# Patient Record
Sex: FEMALE | Race: WHITE | Employment: UNEMPLOYED | URBAN - METROPOLITAN AREA
[De-identification: names, ages, dates, MRNs, and addresses within clinical notes are randomized per-mention and may not be internally consistent; named-entity substitution may affect disease eponyms.]

---

## 2023-10-03 ENCOUNTER — HOSPITAL ENCOUNTER (EMERGENCY)
Facility: HOSPITAL | Age: 38
Discharge: HOME | End: 2023-10-03
Attending: EMERGENCY MEDICINE

## 2023-10-03 VITALS
WEIGHT: 140 LBS | RESPIRATION RATE: 19 BRPM | HEART RATE: 87 BPM | SYSTOLIC BLOOD PRESSURE: 131 MMHG | TEMPERATURE: 98 F | DIASTOLIC BLOOD PRESSURE: 89 MMHG | OXYGEN SATURATION: 100 % | BODY MASS INDEX: 22.5 KG/M2 | HEIGHT: 66 IN

## 2023-10-03 DIAGNOSIS — R21 RASH AND OTHER NONSPECIFIC SKIN ERUPTION: Primary | ICD-10-CM

## 2023-10-03 PROCEDURE — 99284 EMERGENCY DEPT VISIT MOD MDM: CPT

## 2023-10-03 PROCEDURE — 99283 EMERGENCY DEPT VISIT LOW MDM: CPT

## 2023-10-03 RX ORDER — DOXYCYCLINE HYCLATE 100 MG/1
100 CAPSULE ORAL 2 TIMES DAILY
Qty: 14 CAPSULE | Refills: 0 | Status: SHIPPED | OUTPATIENT
Start: 2023-10-03 | End: 2023-10-10

## 2023-10-03 RX ORDER — ESCITALOPRAM OXALATE 10 MG/1
10 TABLET ORAL DAILY
COMMUNITY

## 2023-10-03 NOTE — ED INITIAL ASSESSMENT (HPI)
36y/o female arrives with c/o facial flushing starting at 1130 am. Pt was started on a medrol dose pack and clindamycin 300mg QID yesterday for a skin reaction from a bug bite. She had 3 doses yesterday and 1 this am of clindamycin. Pt called her pcp who recommended she be evaluated. No YARA, facial swelling, difficulty swallowing, or hives.

## 2023-10-03 NOTE — DISCHARGE INSTRUCTIONS
Please take doxycycline for 7 days. Drink a full glass of water after taking each doxycycline capsule to avoid esophageal irritation.

## 2024-01-14 ENCOUNTER — APPOINTMENT (OUTPATIENT)
Dept: CT IMAGING | Facility: HOSPITAL | Age: 39
End: 2024-01-14
Attending: NURSE PRACTITIONER
Payer: COMMERCIAL

## 2024-01-14 ENCOUNTER — APPOINTMENT (OUTPATIENT)
Dept: ULTRASOUND IMAGING | Facility: HOSPITAL | Age: 39
End: 2024-01-14
Attending: NURSE PRACTITIONER
Payer: COMMERCIAL

## 2024-01-14 ENCOUNTER — APPOINTMENT (OUTPATIENT)
Dept: MRI IMAGING | Facility: HOSPITAL | Age: 39
End: 2024-01-14
Attending: NURSE PRACTITIONER
Payer: COMMERCIAL

## 2024-01-14 ENCOUNTER — APPOINTMENT (OUTPATIENT)
Dept: GENERAL RADIOLOGY | Facility: HOSPITAL | Age: 39
End: 2024-01-14
Attending: NURSE PRACTITIONER
Payer: COMMERCIAL

## 2024-01-14 ENCOUNTER — HOSPITAL ENCOUNTER (OUTPATIENT)
Facility: HOSPITAL | Age: 39
Setting detail: OBSERVATION
Discharge: HOME | End: 2024-01-15
Attending: STUDENT IN AN ORGANIZED HEALTH CARE EDUCATION/TRAINING PROGRAM | Admitting: STUDENT IN AN ORGANIZED HEALTH CARE EDUCATION/TRAINING PROGRAM
Payer: COMMERCIAL

## 2024-01-14 DIAGNOSIS — K80.50 BILIARY COLIC: Primary | ICD-10-CM

## 2024-01-14 DIAGNOSIS — R10.11 RUQ PAIN: ICD-10-CM

## 2024-01-14 DIAGNOSIS — R05.1 ACUTE COUGH: ICD-10-CM

## 2024-01-14 DIAGNOSIS — K83.8 DILATION OF COMMON BILE DUCT: ICD-10-CM

## 2024-01-14 LAB
ALBUMIN SERPL-MCNC: 4.8 G/DL (ref 3.2–4.8)
ALBUMIN/GLOB SERPL: 1.7 {RATIO} (ref 1–2)
ALP LIVER SERPL-CCNC: 89 U/L
ALT SERPL-CCNC: 20 U/L
ANION GAP SERPL CALC-SCNC: 3 MMOL/L (ref 0–18)
AST SERPL-CCNC: 24 U/L (ref ?–34)
ATRIAL RATE: 100 BPM
B-HCG UR QL: NEGATIVE
BASOPHILS # BLD AUTO: 0.03 X10(3) UL (ref 0–0.2)
BASOPHILS NFR BLD AUTO: 0.4 %
BILIRUB SERPL-MCNC: 0.8 MG/DL (ref 0.3–1.2)
BILIRUB UR QL: NEGATIVE
BUN BLD-MCNC: 12 MG/DL (ref 9–23)
BUN/CREAT SERPL: 13.5 (ref 10–20)
CALCIUM BLD-MCNC: 9.6 MG/DL (ref 8.7–10.4)
CHLORIDE SERPL-SCNC: 105 MMOL/L (ref 98–112)
CLARITY UR: CLEAR
CO2 SERPL-SCNC: 28 MMOL/L (ref 21–32)
COLOR UR: COLORLESS
CREAT BLD-MCNC: 0.89 MG/DL
D DIMER PPP FEU-MCNC: <0.27 UG/ML FEU (ref ?–0.5)
DEPRECATED RDW RBC AUTO: 43.3 FL (ref 35.1–46.3)
EGFRCR SERPLBLD CKD-EPI 2021: 85 ML/MIN/1.73M2 (ref 60–?)
EOSINOPHIL # BLD AUTO: 0.1 X10(3) UL (ref 0–0.7)
EOSINOPHIL NFR BLD AUTO: 1.2 %
ERYTHROCYTE [DISTWIDTH] IN BLOOD BY AUTOMATED COUNT: 12.4 % (ref 11–15)
FLUAV + FLUBV RNA SPEC NAA+PROBE: NEGATIVE
FLUAV + FLUBV RNA SPEC NAA+PROBE: NEGATIVE
GLOBULIN PLAS-MCNC: 2.8 G/DL (ref 2.8–4.4)
GLUCOSE BLD-MCNC: 92 MG/DL (ref 70–99)
GLUCOSE UR-MCNC: NORMAL MG/DL
HCT VFR BLD AUTO: 43.5 %
HGB BLD-MCNC: 14.7 G/DL
HGB UR QL STRIP.AUTO: NEGATIVE
IMM GRANULOCYTES # BLD AUTO: 0.02 X10(3) UL (ref 0–1)
IMM GRANULOCYTES NFR BLD: 0.2 %
KETONES UR-MCNC: NEGATIVE MG/DL
LEUKOCYTE ESTERASE UR QL STRIP.AUTO: NEGATIVE
LIPASE SERPL-CCNC: 41 U/L (ref 13–75)
LYMPHOCYTES # BLD AUTO: 2.15 X10(3) UL (ref 1–4)
LYMPHOCYTES NFR BLD AUTO: 25.4 %
MCH RBC QN AUTO: 32.2 PG (ref 26–34)
MCHC RBC AUTO-ENTMCNC: 33.8 G/DL (ref 31–37)
MCV RBC AUTO: 95.2 FL
MONOCYTES # BLD AUTO: 0.89 X10(3) UL (ref 0.1–1)
MONOCYTES NFR BLD AUTO: 10.5 %
NEUTROPHILS # BLD AUTO: 5.29 X10 (3) UL (ref 1.5–7.7)
NEUTROPHILS # BLD AUTO: 5.29 X10(3) UL (ref 1.5–7.7)
NEUTROPHILS NFR BLD AUTO: 62.3 %
NITRITE UR QL STRIP.AUTO: NEGATIVE
OSMOLALITY SERPL CALC.SUM OF ELEC: 281 MOSM/KG (ref 275–295)
P AXIS: 77 DEGREES
P-R INTERVAL: 130 MS
PH UR: 6.5 [PH] (ref 5–8)
PLATELET # BLD AUTO: 261 10(3)UL (ref 150–450)
POTASSIUM SERPL-SCNC: 3.7 MMOL/L (ref 3.5–5.1)
PROT SERPL-MCNC: 7.6 G/DL (ref 5.7–8.2)
PROT UR-MCNC: NEGATIVE MG/DL
Q-T INTERVAL: 368 MS
QRS DURATION: 68 MS
QTC CALCULATION (BEZET): 474 MS
R AXIS: 60 DEGREES
RBC # BLD AUTO: 4.57 X10(6)UL
RSV RNA SPEC NAA+PROBE: NEGATIVE
SARS-COV-2 RNA RESP QL NAA+PROBE: NOT DETECTED
SODIUM SERPL-SCNC: 136 MMOL/L (ref 136–145)
SP GR UR STRIP: 1 (ref 1–1.03)
T AXIS: 39 DEGREES
TROPONIN I SERPL HS-MCNC: <3 NG/L
UROBILINOGEN UR STRIP-ACNC: NORMAL
VENTRICULAR RATE: 100 BPM
WBC # BLD AUTO: 8.5 X10(3) UL (ref 4–11)

## 2024-01-14 PROCEDURE — 99285 EMERGENCY DEPT VISIT HI MDM: CPT

## 2024-01-14 PROCEDURE — 76376 3D RENDER W/INTRP POSTPROCES: CPT | Performed by: NURSE PRACTITIONER

## 2024-01-14 PROCEDURE — 81003 URINALYSIS AUTO W/O SCOPE: CPT

## 2024-01-14 PROCEDURE — 96374 THER/PROPH/DIAG INJ IV PUSH: CPT

## 2024-01-14 PROCEDURE — A9575 INJ GADOTERATE MEGLUMI 0.1ML: HCPCS | Performed by: NURSE PRACTITIONER

## 2024-01-14 PROCEDURE — 74177 CT ABD & PELVIS W/CONTRAST: CPT | Performed by: NURSE PRACTITIONER

## 2024-01-14 PROCEDURE — S0028 INJECTION, FAMOTIDINE, 20 MG: HCPCS | Performed by: NURSE PRACTITIONER

## 2024-01-14 PROCEDURE — 96375 TX/PRO/DX INJ NEW DRUG ADDON: CPT

## 2024-01-14 PROCEDURE — 84484 ASSAY OF TROPONIN QUANT: CPT | Performed by: NURSE PRACTITIONER

## 2024-01-14 PROCEDURE — 71045 X-RAY EXAM CHEST 1 VIEW: CPT | Performed by: NURSE PRACTITIONER

## 2024-01-14 PROCEDURE — 0241U SARS-COV-2/FLU A AND B/RSV BY PCR (GENEXPERT): CPT

## 2024-01-14 PROCEDURE — 93005 ELECTROCARDIOGRAM TRACING: CPT

## 2024-01-14 PROCEDURE — 85025 COMPLETE CBC W/AUTO DIFF WBC: CPT

## 2024-01-14 PROCEDURE — 81025 URINE PREGNANCY TEST: CPT

## 2024-01-14 PROCEDURE — 76705 ECHO EXAM OF ABDOMEN: CPT | Performed by: NURSE PRACTITIONER

## 2024-01-14 PROCEDURE — 74183 MRI ABD W/O CNTR FLWD CNTR: CPT | Performed by: NURSE PRACTITIONER

## 2024-01-14 PROCEDURE — 83690 ASSAY OF LIPASE: CPT | Performed by: NURSE PRACTITIONER

## 2024-01-14 PROCEDURE — 85379 FIBRIN DEGRADATION QUANT: CPT | Performed by: NURSE PRACTITIONER

## 2024-01-14 PROCEDURE — 80053 COMPREHEN METABOLIC PANEL: CPT

## 2024-01-14 RX ORDER — PROCHLORPERAZINE EDISYLATE 5 MG/ML
5 INJECTION INTRAMUSCULAR; INTRAVENOUS EVERY 8 HOURS PRN
Status: DISCONTINUED | OUTPATIENT
Start: 2024-01-14 | End: 2024-01-15

## 2024-01-14 RX ORDER — ONDANSETRON 2 MG/ML
4 INJECTION INTRAMUSCULAR; INTRAVENOUS EVERY 6 HOURS PRN
Status: DISCONTINUED | OUTPATIENT
Start: 2024-01-14 | End: 2024-01-15

## 2024-01-14 RX ORDER — SODIUM CHLORIDE 9 MG/ML
INJECTION, SOLUTION INTRAVENOUS CONTINUOUS
Status: DISCONTINUED | OUTPATIENT
Start: 2024-01-14 | End: 2024-01-15

## 2024-01-14 RX ORDER — GADOTERATE MEGLUMINE 376.9 MG/ML
15 INJECTION INTRAVENOUS
Status: COMPLETED | OUTPATIENT
Start: 2024-01-14 | End: 2024-01-14

## 2024-01-14 RX ORDER — TRAMADOL HYDROCHLORIDE 50 MG/1
50 TABLET ORAL EVERY 6 HOURS PRN
Status: DISCONTINUED | OUTPATIENT
Start: 2024-01-14 | End: 2024-01-15

## 2024-01-14 RX ORDER — DROSPIRENONE 4 MG/1
4 TABLET, FILM COATED ORAL DAILY
COMMUNITY

## 2024-01-14 RX ORDER — MORPHINE SULFATE 2 MG/ML
2 INJECTION, SOLUTION INTRAMUSCULAR; INTRAVENOUS ONCE
Status: COMPLETED | OUTPATIENT
Start: 2024-01-14 | End: 2024-01-14

## 2024-01-14 RX ORDER — MAGNESIUM HYDROXIDE/ALUMINUM HYDROXICE/SIMETHICONE 120; 1200; 1200 MG/30ML; MG/30ML; MG/30ML
30 SUSPENSION ORAL ONCE
Status: COMPLETED | OUTPATIENT
Start: 2024-01-14 | End: 2024-01-14

## 2024-01-14 RX ORDER — MELATONIN
3 NIGHTLY PRN
Status: DISCONTINUED | OUTPATIENT
Start: 2024-01-14 | End: 2024-01-15

## 2024-01-14 RX ORDER — FAMOTIDINE 10 MG/ML
20 INJECTION, SOLUTION INTRAVENOUS ONCE
Status: COMPLETED | OUTPATIENT
Start: 2024-01-14 | End: 2024-01-14

## 2024-01-14 RX ORDER — ESCITALOPRAM OXALATE 10 MG/1
10 TABLET ORAL EVERY MORNING
Status: DISCONTINUED | OUTPATIENT
Start: 2024-01-15 | End: 2024-01-15

## 2024-01-14 NOTE — H&P
[facility] [place] Hospitalist H&P       CC:   Chief Complaint   Patient presents with    Abdomen/Flank Pain        PCP: PHYSICIAN NONSTAFF    History of Present Illness: Patient is a 38 year old female with PMH sig for SHAILESH and kidney stones who presented to the hospital with RUQ pain. Patient states that she has been having on and off RUQ pain since thanksgiving. She states that she has had multiple respiratory illnesses over this time period and every time she develops a cough she then has the pain. The pain has persisted. She has tried NSAIDS  with improvement but then pain returns. This morning she woke up and was having some pain. She then had breakfast with her children which included muffins and donuts. Afterwards she developed severe RUQ pain which prompted her to come to the ED. No nausea, fevers, chest pain. Upon arrival vitals stable, labwork unremarkable. CT A/P showed dilated CBD. Plan for admission for further medical management.     PMH  Past Medical History:   Diagnosis Date    Endometriosis     Kidney stones         PSH  Past Surgical History:   Procedure Laterality Date    LITHOTRIPSY          ALL:  Allergies   Allergen Reactions    Norco [Apap-Fd&C Blue #1-Hydrocodone] HIVES    Potassium Citrate HIVES    Clindamycin RASH        Home Medications:  No outpatient medications have been marked as taking for the 1/14/24 encounter (Hospital Encounter).         Soc Hx  Social History     Tobacco Use    Smoking status: Every Day     Packs/day: 0.50     Years: 1.00     Additional pack years: 0.00     Total pack years: 0.50     Types: Cigarettes    Smokeless tobacco: Never   Substance Use Topics    Alcohol use: Yes     Alcohol/week: 14.0 standard drinks of alcohol     Types: 14 Glasses of wine per week        Fam Hx  No family history on file.    Review of Systems  Comprehensive ROS reviewed and negative except for what's stated above.     OBJECTIVE:  BP (!) 130/91   Pulse 87   Temp 97.7 °F (36.5 °C)  (Temporal)   Resp 20   Ht 5' 6\" (1.676 m)   Wt 138 lb (62.6 kg)   LMP  (LMP Unknown)   SpO2 99%   BMI 22.27 kg/m²   General:  Alert, no distress   Head:  Normocephalic               Neck: Supple   Lungs:   Clear to auscultation bilaterally. Normal effort, R chest wall TTP       Heart:  Regular rate and rhythm, S1, S2 normal   Abdomen:   Soft, mild RUQ TTP   Extremities: Extremities normal             Diagnostic Data:    CBC/Chem  Recent Labs   Lab 01/14/24  1337   WBC 8.5   HGB 14.7   MCV 95.2   .0       Recent Labs   Lab 01/14/24  1337      K 3.7      CO2 28.0   BUN 12   CREATSERUM 0.89   GLU 92   CA 9.6       Recent Labs   Lab 01/14/24  1337   ALT 20   AST 24   ALB 4.8       No results for input(s): \"TROP\" in the last 168 hours.    Radiology: US GALLBLADDER (CPT=76705)    Result Date: 1/14/2024  CONCLUSION: Common bile duct is enlarged at 10 mm.  Etiology unclear, however raises the possibility of distal common bile duct stricture or choledocholithiasis.  Otherwise unremarkable CT appearance of the gallbladder.  No intrahepatic biliary ductal dilatation.     Dictated by (CST): Ezra Jorgensen MD on 1/14/2024 at 4:31 PM     Finalized by (CST): Ezra Jorgensen MD on 1/14/2024 at 4:32 PM          CT ABDOMEN+PELVIS(CONTRAST ONLY)(CPT=74177)    Result Date: 1/14/2024  CONCLUSION:  Nonspecific enlargement the common bile duct at 10 mm.  No significant intrahepatic biliary ductal dilatation.  Unremarkable CT appearance of the gallbladder.  Etiology of the biliary ductal dilatation is unclear.  Correlate for biliary colic and biliary  lab abnormality.  Dictated by (CST): Ezra Jorgensen MD on 1/14/2024 at 2:53 PM     Finalized by (CST): Ezra Jorgensen MD on 1/14/2024 at 2:56 PM          XR CHEST AP PORTABLE  (CPT=71045)    Result Date: 1/14/2024  CONCLUSION: No acute cardiopulmonary abnormality.    Dictated by (CST): Ezra Jorgensen MD on 1/14/2024 at 2:37 PM     Finalized by (CST): Ezra Jorgensen MD on 1/14/2024  at 2:37 PM             ASSESSMENT / PLAN:   Patient is a 38 year old female with PMH sig for SHAILESH and kidney stones who presented to the hospital with RUQ pain.     RUQ abdominal pain  - ddx includes combination of costochondritis along with biliary colic, possible choledocholithiasis or patient may have passed stone  - labwork unremarkable, vitals stable  - CT A/P and RUQ US reviewed, dilated CBD at 1 cm, unremarkable gallbladder  - prn pain medications  - IVF, NPO for now  - MRCP ordered, if abnormal then will GI colleagues to see    Costochondritis  - pain to palpation over the R chest  - PRN pain medications  - lidocaine patch    SHAILESH  - lexapro    FN:  - IVF: NS  - Diet: NPO    DVT Prophy: ambulation  Lines: PIV    Dispo: pending clinical course    Outpatient records or previous hospital records reviewed.     Further recommendations pending patient's clinical course.  [facility] hospitalist to continue to follow patient while in house    Patient and/or patient's family given opportunity to ask questions and note understanding and agreeing with therapeutic plan as outlined    Thank You,  Samantha Duvall DO    Hospitalist with [facility]  Answering Service number: [phone]

## 2024-01-14 NOTE — ED QUICK NOTES
Orders for admission, patient is aware of plan and ready to go upstairs. Any questions, please call ED RN Miroslava at extension [phone].     Patient Covid vaccination status: Fully vaccinated     COVID Test Ordered in ED: SARS-CoV-2/Flu A and B/RSV by PCR (GeneXpert)    COVID Suspicion at Admission: N/A    Running Infusions:  None    Mental Status/LOC at time of transport: alert and orientated x4, independent walker, no home 02, lives in private home with family.     Other pertinent information:   CIWA score: N/A   NIH score:  N/A

## 2024-01-14 NOTE — ED INITIAL ASSESSMENT (HPI)
C/o RUQ abd pain that radiates to right flank. Reports hx of kidney stones but usually to left kidney. Pt reports she had mild pain since thanksgiving but has had a cold/cough, unsure if she caused worsening pain due to coughing.

## 2024-01-14 NOTE — ED PROVIDER NOTES
Patient Seen in: [facility] Emergency Department      History     Chief Complaint   Patient presents with    Abdomen/Flank Pain     Stated Complaint: Flank Pain    Subjective:   HPI    38-year-old female presents today with complaints of intermittent right upper quadrant pain since Thanksgiving.  Patient states after Thanksgiving she developed COVID and has had a cough since then.  Patient states she has had several chest x-rays to rule out pneumonia and all was negative.  Patient states her primary care provider attributed the right sided torso pain to inflammation in the cartilage and she has been taking meloxicam with some relief.  Patient states that her pain began to get worse today and she has severe pain with deep inspiration in the right upper quadrant.  Patient states that she is on birth control and does not know if it has estrogen in it.  Patient denies any recent travel.  Patient denies any shortness of breath.  Patient denies any changes in her bowel pattern.  Patient denies any changes in intake.    Objective:   Past Medical History:   Diagnosis Date    Endometriosis     Kidney stones               Past Surgical History:   Procedure Laterality Date    LITHOTRIPSY                  Social History     Socioeconomic History    Marital status:    Tobacco Use    Smoking status: Every Day     Packs/day: 0.50     Years: 1.00     Additional pack years: 0.00     Total pack years: 0.50     Types: Cigarettes    Smokeless tobacco: Never   Vaping Use    Vaping Use: Never used   Substance and Sexual Activity    Alcohol use: Yes     Alcohol/week: 14.0 standard drinks of alcohol     Types: 14 Glasses of wine per week    Drug use: Not Currently              Review of Systems   Constitutional: Negative.    HENT: Negative.     Eyes: Negative.    Respiratory:  Positive for cough. Negative for shortness of breath.    Cardiovascular: Negative.    Gastrointestinal:  Positive for abdominal pain.   Endocrine:  Negative.    Genitourinary: Negative.    Musculoskeletal: Negative.    Allergic/Immunologic: Negative.    Neurological: Negative.    Hematological: Negative.    Psychiatric/Behavioral: Negative.         Positive for stated complaint: Flank Pain  Other systems are as noted in HPI.  Constitutional and vital signs reviewed.      All other systems reviewed and negative except as noted above.    Physical Exam     ED Triage Vitals [01/14/24 1305]   /84   Pulse 108   Resp 20   Temp 97.7 °F (36.5 °C)   Temp src Temporal   SpO2 99 %   O2 Device None (Room air)       Current:/88   Pulse 75   Temp 97.7 °F (36.5 °C) (Temporal)   Resp 20   Ht 167.6 cm (5' 6\")   Wt 62.6 kg   LMP  (LMP Unknown)   SpO2 99%   BMI 22.27 kg/m²         Physical Exam  Vitals and nursing note reviewed. Exam conducted with a chaperone present.   Constitutional:       Appearance: She is well-developed and normal weight.   HENT:      Head: Normocephalic.      Mouth/Throat:      Mouth: Mucous membranes are moist.      Pharynx: Oropharynx is clear.   Eyes:      Extraocular Movements: Extraocular movements intact.      Pupils: Pupils are equal, round, and reactive to light.   Cardiovascular:      Rate and Rhythm: Regular rhythm. Tachycardia present.      Heart sounds: Normal heart sounds.   Pulmonary:      Effort: Pulmonary effort is normal.      Breath sounds: Wheezing and rhonchi present.      Comments: Right upper torso pain reproducible with deep inspiration.  Abdominal:      General: Abdomen is flat. Bowel sounds are normal.      Palpations: Abdomen is soft.      Tenderness: There is abdominal tenderness in the right upper quadrant and epigastric area. There is no right CVA tenderness or left CVA tenderness.   Skin:     General: Skin is warm and dry.      Capillary Refill: Capillary refill takes less than 2 seconds.   Neurological:      Mental Status: She is alert and oriented to person, place, and time.   Psychiatric:         Mood and  Affect: Mood normal.             ED Course     Labs Reviewed   URINALYSIS, ROUTINE - Abnormal; Notable for the following components:       Result Value    Urine Color Colorless (*)     All other components within normal limits   COMP METABOLIC PANEL (14) - Normal   LIPASE - Normal   D-DIMER - Normal   TROPONIN I HIGH SENSITIVITY - Normal   POCT PREGNANCY URINE - Normal   SARS-COV-2/FLU A AND B/RSV BY PCR (GENEXPERT) - Normal    Narrative:     This test is intended for the qualitative detection and differentiation of SARS-CoV-2, influenza A, influenza B, and respiratory syncytial virus (RSV) viral RNA in nasopharyngeal or nares swabs from individuals suspected of respiratory viral infection consistent with COVID-19 by their healthcare provider. Signs and symptoms of respiratory viral infection due to SARS-CoV-2, influenza, and RSV can be similar.    Test performed using the Xpert Xpress SARS-CoV-2/FLU/RSV (real time RT-PCR)  assay on the [employer] instrument, [employer], [place] [address].   This test is being used under the Food and Drug Administration's Emergency Use Authorization.    The authorized Fact Sheet for Healthcare Providers for this assay is available upon request from the laboratory.   CBC WITH DIFFERENTIAL WITH PLATELET    Narrative:     The following orders were created for panel order CBC With Differential With Platelet.  Procedure                               Abnormality         Status                     ---------                               -----------         ------                     CBC W/ DIFFERENTIAL[[id number]]                              Final result                 Please view results for these tests on the individual orders.   CBC W/ DIFFERENTIAL                      MDM      38-year-old female presents today with complaints of intermittent right upper quadrant pain since Thanksgiving.  Patient states after Thanksgiving she developed COVID and has had a cough since then.  Patient  states she has had several chest x-rays to rule out pneumonia and all was negative.  Patient states her primary care provider attributed the right sided torso pain to inflammation in the cartilage and she has been taking meloxicam with some relief.  Patient states that her pain began to get worse today and she has severe pain with deep inspiration in the right upper quadrant.  Patient states that she is on birth control and does not know if it has estrogen in it.  Patient denies any recent travel.  Patient denies any shortness of breath.  Patient denies any changes in her bowel pattern.  Patient denies any changes in intake.  Vital signs: Please see EMR.  Physical exam: Please see exam.  Differential diagnosis: Pneumonia, kidney stone, pyelonephritis, cholecystitis, pulmonary embolism.  PERC rule: PE cannot be ruled out.  Recent Results (from the past 24 hour(s))   Comp Metabolic Panel (14)    Collection Time: 01/14/24  1:37 PM   Result Value Ref Range    Glucose 92 70 - 99 mg/dL    Sodium 136 136 - 145 mmol/L    Potassium 3.7 3.5 - 5.1 mmol/L    Chloride 105 98 - 112 mmol/L    CO2 28.0 21.0 - 32.0 mmol/L    Anion Gap 3 0 - 18 mmol/L    BUN 12 9 - 23 mg/dL    Creatinine 0.89 0.55 - 1.02 mg/dL    BUN/CREA Ratio 13.5 10.0 - 20.0    Calcium, Total 9.6 8.7 - 10.4 mg/dL    Calculated Osmolality 281 275 - 295 mOsm/kg    eGFR-Cr 85 >=60 mL/min/1.73m2    ALT 20 10 - 49 U/L    AST 24 <=34 U/L    Alkaline Phosphatase 89 37 - 98 U/L    Bilirubin, Total 0.8 0.3 - 1.2 mg/dL    Total Protein 7.6 5.7 - 8.2 g/dL    Albumin 4.8 3.2 - 4.8 g/dL    Globulin  2.8 2.8 - 4.4 g/dL    A/G Ratio 1.7 1.0 - 2.0   Urinalysis, Routine    Collection Time: 01/14/24  1:37 PM   Result Value Ref Range    Urine Color Colorless (A) Yellow    Clarity Urine Clear Clear    Spec Gravity 1.005 1.005 - 1.030    Glucose Urine Normal Normal mg/dL    Bilirubin Urine Negative Negative    Ketones Urine Negative Negative mg/dL    Blood Urine Negative Negative     pH Urine 6.5 5.0 - 8.0    Protein Urine Negative Negative mg/dL    Urobilinogen Urine Normal Normal    Nitrite Urine Negative Negative    Leukocyte Esterase Urine Negative Negative    Microscopic Microscopic not indicated    SARS-CoV-2/Flu A and B/RSV by PCR (GeneXpert)    Collection Time: 01/14/24  1:37 PM    Specimen: Nares; Other   Result Value Ref Range    SARS-CoV-2 (COVID-19) - (GeneXpert) Not Detected Not Detected    Influenza A by PCR Negative Negative    Influenza B by PCR Negative Negative    RSV by PCR Negative Negative   CBC W/ DIFFERENTIAL    Collection Time: 01/14/24  1:37 PM   Result Value Ref Range    WBC 8.5 4.0 - 11.0 x10(3) uL    RBC 4.57 3.80 - 5.30 x10(6)uL    HGB 14.7 12.0 - 16.0 g/dL    HCT 43.5 35.0 - 48.0 %    MCV 95.2 80.0 - 100.0 fL    MCH 32.2 26.0 - 34.0 pg    MCHC 33.8 31.0 - 37.0 g/dL    RDW-SD 43.3 35.1 - 46.3 fL    RDW 12.4 11.0 - 15.0 %    .0 150.0 - 450.0 10(3)uL    Neutrophil Absolute Prelim 5.29 1.50 - 7.70 x10 (3) uL    Neutrophil Absolute 5.29 1.50 - 7.70 x10(3) uL    Lymphocyte Absolute 2.15 1.00 - 4.00 x10(3) uL    Monocyte Absolute 0.89 0.10 - 1.00 x10(3) uL    Eosinophil Absolute 0.10 0.00 - 0.70 x10(3) uL    Basophil Absolute 0.03 0.00 - 0.20 x10(3) uL    Immature Granulocyte Absolute 0.02 0.00 - 1.00 x10(3) uL    Neutrophil % 62.3 %    Lymphocyte % 25.4 %    Monocyte % 10.5 %    Eosinophil % 1.2 %    Basophil % 0.4 %    Immature Granulocyte % 0.2 %   Lipase    Collection Time: 01/14/24  1:37 PM   Result Value Ref Range    Lipase 41 13 - 75 U/L   Troponin I (High Sensitivity)    Collection Time: 01/14/24  1:37 PM   Result Value Ref Range    Troponin I (High Sensitivity) <3 <=34 ng/L   D-Dimer    Collection Time: 01/14/24  2:03 PM   Result Value Ref Range    D-Dimer <0.27 <0.50 ug/mL FEU   POCT Pregnancy, Urine    Collection Time: 01/14/24  2:03 PM   Result Value Ref Range    POCT Urine Pregnancy Negative Negative   EKG    Collection Time: 01/14/24  2:18 PM   Result  Value Ref Range    Ventricular rate 100 BPM    Atrial rate 100 BPM    P-R Interval 130 ms    QRS Duration 68 ms    Q-T Interval 368 ms    QTC Calculation (Bezet) 474 ms    P Axis 77 degrees    R Axis 60 degrees    T Axis 39 degrees     MRI ABDOMEN AND MRCP W/3D (W+W0)(CPT=74183/80069)    Result Date: 1/14/2024  CONCLUSION:  MRCP demonstrates common bile duct enlargement at 9 mm.  Distal common bile duct is blunted.  Punctate filling defect within the distal common bile duct/ampulla suspicious for choledocholithiasis measuring 2-3 mm in diameter.  Dictated by (CST): Ezra Jorgensen MD on 1/14/2024 at 6:57 PM     Finalized by (CST): Ezra Jorgensen MD on 1/14/2024 at 7:01 PM          US GALLBLADDER (CPT=76705)    Result Date: 1/14/2024  CONCLUSION: Common bile duct is enlarged at 10 mm.  Etiology unclear, however raises the possibility of distal common bile duct stricture or choledocholithiasis.  Otherwise unremarkable CT appearance of the gallbladder.  No intrahepatic biliary ductal dilatation.     Dictated by (CST): Ezra Jorgensen MD on 1/14/2024 at 4:31 PM     Finalized by (CST): Ezra Jorgensen MD on 1/14/2024 at 4:32 PM          CT ABDOMEN+PELVIS(CONTRAST ONLY)(CPT=74177)    Result Date: 1/14/2024  CONCLUSION:  Nonspecific enlargement the common bile duct at 10 mm.  No significant intrahepatic biliary ductal dilatation.  Unremarkable CT appearance of the gallbladder.  Etiology of the biliary ductal dilatation is unclear.  Correlate for biliary colic and biliary  lab abnormality.  Dictated by (CST): Ezra Jorgensen MD on 1/14/2024 at 2:53 PM     Finalized by (CST): Ezra Jorgensen MD on 1/14/2024 at 2:56 PM          XR CHEST AP PORTABLE  (CPT=71045)    Result Date: 1/14/2024  CONCLUSION: No acute cardiopulmonary abnormality.    Dictated by (CST): Ezra Jorgensen MD on 1/14/2024 at 2:37 PM     Finalized by (CST): Ezra Jorgensen MD on 1/14/2024 at 2:37 PM         EKG  Order: [id number]  Status: Final result       Visible to patient: Yes  (not seen)       Next appt: None    0 Result Notes      Component  Ref Range & Units 3 d ago   Ventricular rate     Atrial rate     P-R Interval  ms 130   QRS Duration  ms 68   Q-T Interval  ms 368   QTC Calculation (Bezet)  ms 474   P Axis  degrees 77   R Axis  degrees 60   T Axis  degrees 39   Resulting Agency [facility]              Narrative  Performed by: [facility]  Normal sinus rhythm  Possible Anteroseptal infarct , age undetermined  Abnormal ECG  No previous ECGs found in [facility]  Confirmed by VAL WALL PRATIK ([id number]) on 1/14/2024 3:08:46 PM           Will diagnosed with enlarged common bile duct and biliary colic.  Patient will be admitted due to biliary colic.  Hospitalist being paged.  Hospitalist to patient's bedside.  MRCP with and without ordered.  Bed requested for medical observation.  Note to Patient  The 21st Century Cures Act makes medical notes like these available to patients in the interest of transparency. However, be advised this is a medical document and is intended as [id number]-oaxy communication; it is written in medical language and may appear blunt, direct, or contain abbreviations or verbiage that are unfamiliar. Medical documents are intended to carry relevant information, facts as evident, and the clinical opinion of the practitioner.     This report has been produced using speech recognition software, and may contain errors related to grammar, punctuation, spelling, words or phrases unrecognized or not translated appropriately to text; these errors may be referred to the dictating provider for further clarification and/or addendum as needed.    Admission disposition: 1/14/2024  5:29 PM                                        Medical Decision Making  38-year-old female presents today with complaints of intermittent right upper quadrant pain since Thanksgiving.  Patient states after Thanksgiving she developed COVID and has had a cough since then.  Patient states she has had several  chest x-rays to rule out pneumonia and all was negative.  Patient states her primary care provider attributed the right sided torso pain to inflammation in the cartilage and she has been taking meloxicam with some relief.  Patient states that her pain began to get worse today and she has severe pain with deep inspiration in the right upper quadrant.  Patient states that she is on birth control and does not know if it has estrogen in it.  Patient denies any recent travel.  Patient denies any shortness of breath.  Patient denies any changes in her bowel pattern.  Patient denies any changes in intake.    Problems Addressed:  Acute cough: acute illness or injury  Biliary colic: acute illness or injury  Dilation of common bile duct: acute illness or injury  RUQ pain: acute illness or injury    Amount and/or Complexity of Data Reviewed  Labs: ordered. Decision-making details documented in ED Course.  Radiology: ordered. Decision-making details documented in ED Course.  ECG/medicine tests: ordered. Decision-making details documented in ED Course.    Risk  Decision regarding hospitalization.        Disposition and Plan     Clinical Impression:  1. RUQ pain    2. Acute cough    3. Dilation of common bile duct    4. Biliary colic         Disposition:  Admit  1/14/2024  5:29 pm    Follow-up:  No follow-up provider specified.        Medications Prescribed:  Current Discharge Medication List                            Hospital Problems       Present on Admission             ICD-10-CM Noted POA    * (Principal) RUQ pain R10.11 1/14/2024 Unknown

## 2024-01-15 ENCOUNTER — ANESTHESIA EVENT (OUTPATIENT)
Dept: ENDOSCOPY | Facility: HOSPITAL | Age: 39
End: 2024-01-15
Payer: COMMERCIAL

## 2024-01-15 ENCOUNTER — APPOINTMENT (OUTPATIENT)
Dept: GENERAL RADIOLOGY | Facility: HOSPITAL | Age: 39
End: 2024-01-15
Attending: INTERNAL MEDICINE
Payer: COMMERCIAL

## 2024-01-15 ENCOUNTER — ANESTHESIA (OUTPATIENT)
Dept: ENDOSCOPY | Facility: HOSPITAL | Age: 39
End: 2024-01-15
Payer: COMMERCIAL

## 2024-01-15 VITALS
RESPIRATION RATE: 20 BRPM | SYSTOLIC BLOOD PRESSURE: 112 MMHG | OXYGEN SATURATION: 97 % | BODY MASS INDEX: 21.58 KG/M2 | HEART RATE: 62 BPM | HEIGHT: 66 IN | TEMPERATURE: 99 F | WEIGHT: 134.31 LBS | DIASTOLIC BLOOD PRESSURE: 74 MMHG

## 2024-01-15 LAB
ALBUMIN SERPL-MCNC: 4.1 G/DL (ref 3.2–4.8)
ALBUMIN/GLOB SERPL: 1.6 {RATIO} (ref 1–2)
ALP LIVER SERPL-CCNC: 68 U/L
ALT SERPL-CCNC: 16 U/L
ANION GAP SERPL CALC-SCNC: 5 MMOL/L (ref 0–18)
AST SERPL-CCNC: 18 U/L (ref ?–34)
BILIRUB SERPL-MCNC: 1.1 MG/DL (ref 0.3–1.2)
BUN BLD-MCNC: 12 MG/DL (ref 9–23)
BUN/CREAT SERPL: 14.1 (ref 10–20)
CALCIUM BLD-MCNC: 9 MG/DL (ref 8.7–10.4)
CHLORIDE SERPL-SCNC: 110 MMOL/L (ref 98–112)
CO2 SERPL-SCNC: 26 MMOL/L (ref 21–32)
CREAT BLD-MCNC: 0.85 MG/DL
DEPRECATED RDW RBC AUTO: 43.7 FL (ref 35.1–46.3)
EGFRCR SERPLBLD CKD-EPI 2021: 89 ML/MIN/1.73M2 (ref 60–?)
ERYTHROCYTE [DISTWIDTH] IN BLOOD BY AUTOMATED COUNT: 12.5 % (ref 11–15)
GLOBULIN PLAS-MCNC: 2.5 G/DL (ref 2.8–4.4)
GLUCOSE BLD-MCNC: 86 MG/DL (ref 70–99)
HCT VFR BLD AUTO: 40.5 %
HGB BLD-MCNC: 13.6 G/DL
MCH RBC QN AUTO: 31.9 PG (ref 26–34)
MCHC RBC AUTO-ENTMCNC: 33.6 G/DL (ref 31–37)
MCV RBC AUTO: 95.1 FL
OSMOLALITY SERPL CALC.SUM OF ELEC: 291 MOSM/KG (ref 275–295)
PLATELET # BLD AUTO: 187 10(3)UL (ref 150–450)
POTASSIUM SERPL-SCNC: 3.8 MMOL/L (ref 3.5–5.1)
PROT SERPL-MCNC: 6.6 G/DL (ref 5.7–8.2)
RBC # BLD AUTO: 4.26 X10(6)UL
SODIUM SERPL-SCNC: 141 MMOL/L (ref 136–145)
WBC # BLD AUTO: 5.7 X10(3) UL (ref 4–11)

## 2024-01-15 PROCEDURE — 85027 COMPLETE CBC AUTOMATED: CPT | Performed by: STUDENT IN AN ORGANIZED HEALTH CARE EDUCATION/TRAINING PROGRAM

## 2024-01-15 PROCEDURE — 80053 COMPREHEN METABOLIC PANEL: CPT | Performed by: STUDENT IN AN ORGANIZED HEALTH CARE EDUCATION/TRAINING PROGRAM

## 2024-01-15 PROCEDURE — 0DJ08ZZ INSPECTION OF UPPER INTESTINAL TRACT, VIA NATURAL OR ARTIFICIAL OPENING ENDOSCOPIC: ICD-10-PCS | Performed by: INTERNAL MEDICINE

## 2024-01-15 RX ORDER — IBUPROFEN 200 MG
200 TABLET ORAL EVERY 6 HOURS PRN
Status: DISCONTINUED | OUTPATIENT
Start: 2024-01-15 | End: 2024-01-15

## 2024-01-15 RX ORDER — SODIUM CHLORIDE, SODIUM LACTATE, POTASSIUM CHLORIDE, CALCIUM CHLORIDE 600; 310; 30; 20 MG/100ML; MG/100ML; MG/100ML; MG/100ML
INJECTION, SOLUTION INTRAVENOUS CONTINUOUS PRN
Status: DISCONTINUED | OUTPATIENT
Start: 2024-01-15 | End: 2024-01-15 | Stop reason: SURG

## 2024-01-15 RX ORDER — LIDOCAINE HYDROCHLORIDE 10 MG/ML
INJECTION, SOLUTION EPIDURAL; INFILTRATION; INTRACAUDAL; PERINEURAL AS NEEDED
Status: DISCONTINUED | OUTPATIENT
Start: 2024-01-15 | End: 2024-01-15 | Stop reason: SURG

## 2024-01-15 RX ADMIN — SODIUM CHLORIDE, SODIUM LACTATE, POTASSIUM CHLORIDE, CALCIUM CHLORIDE: 600; 310; 30; 20 INJECTION, SOLUTION INTRAVENOUS at 16:07:00

## 2024-01-15 RX ADMIN — LIDOCAINE HYDROCHLORIDE 50 MG: 10 INJECTION, SOLUTION EPIDURAL; INFILTRATION; INTRACAUDAL; PERINEURAL at 16:10:00

## 2024-01-15 NOTE — CONSULTS
[facility]    Report of Consultation    Nia Reilly Patient Status:  Observation    1985 MRN [id number]   Location [facility] 5SW/SE Attending Samantha Duvall,    Hosp Day # 0 PCP PHYSICIAN NONSTAFF     Date of Admission:  2024  Date of Consult:  1/15/2024  Reason for Consultation:   Dilated CBD    History of Present Illness:   Patient is a 38 year old female who was admitted to the hospital for RUQ pain:  Patient states that she has been having on and off RUQ pain since . Prior to arrival in ER developed severe RUQ pain prompting her to come to the ER. Denies nausea, vomiting, fever. Initial lab work unremarkable CT A/P showed dilated CBD.  MRCP with bile duct dilatation and filling deficit suspicious for choledolithiasis.    abdominal surgeries notable for laparoscopic removal of ovarian cysts    Past Medical History  Past Medical History:   Diagnosis Date    Endometriosis     Kidney stones        Past Surgical History  Past Surgical History:   Procedure Laterality Date    LITHOTRIPSY         Family History  No family history on file.    Social History  Pediatric History   Patient Parents    Not on file     Other Topics Concern    Not on file   Social History Narrative    Not on file           Current Medications:  Current Facility-Administered Medications   Medication Dose Route Frequency    sodium chloride 0.9% infusion   Intravenous Continuous    melatonin tab 3 mg  3 mg Oral Nightly PRN    ondansetron (Zofran) 4 MG/2ML injection 4 mg  4 mg Intravenous Q6H PRN    prochlorperazine (Compazine) 10 MG/2ML injection 5 mg  5 mg Intravenous Q8H PRN    traMADol (Ultram) tab 50 mg  50 mg Oral Q6H PRN    lidocaine-menthol 4-1 % patch 1 patch  1 patch Transdermal Daily    escitalopram (Lexapro) tab 10 mg  10 mg Oral QAM     Medications Prior to Admission   Medication Sig    Drospirenone (SLYND) 4 MG Oral Tab Take 4 tablets by mouth daily.    escitalopram 10 MG  Oral Tab Take 1 tablet (10 mg total) by mouth daily.       Allergies  Allergies   Allergen Reactions    Norco [Apap-Fd&C Blue #1-Hydrocodone] HIVES    Potassium Citrate HIVES    Clindamycin RASH       Review of Systems:   GENERAL HEALTH: feels well otherwise, denies fever or weight loss  SKIN: denies any unusual skin lesions or rashes  EYES: no visual complaints or deficits  HEENT: denies mouth sores  RESPIRATORY: no shortness of breath   CARDIOVASCULAR:no chest pain   GI: Refer to HPI,   : no hematuria  MUSCULOSKELETAL: no joint swelling or warmth  NEURO: no focal weakness or numbness  PSYCHE: no depression or anxiety  HEMATOLOGIC: no easy bruising  ENDOCRINE: no temperature intolerance    Physical Exam:   Blood pressure 126/83, pulse 83, temperature 98.6 °F (37 °C), temperature source Oral, resp. rate 18, height 5' 6\" (1.676 m), weight 134 lb 4.8 oz (60.9 kg), SpO2 98%.    GENERAL: well developed, in no apparent distress  SKIN: no rashes,no suspicious lesions  HEENT: atraumatic, normocephalic, oropharynx clear  NECK: supple,no adenopathy, no masses  LUNGS: clear to auscultation  CARDIO: RRR without murmur  GI: normal active BS, RUQ pain radiating to back, no masses or ascites, normal liver span/no organomegaly  EXTREMITIES: no calf tenderness  MUSCULOSKELETAL: no calf tenderness  PSYCH: normal affect    Results:     Laboratory Data:  Recent Labs   Lab 01/14/24  1337 01/15/24  0543   WBC 8.5 5.7   HGB 14.7 13.6   HCT 43.5 40.5   .0 187.0   CREATSERUM 0.89 0.85   BUN 12 12    141   K 3.7 3.8    110   CO2 28.0 26.0   GLU 92 86   CA 9.6 9.0   ALB 4.8 4.1   ALKPHO 89 68   TP 7.6 6.6   AST 24 18   ALT 20 16       Recent Labs   Lab 01/14/24  1337 01/14/24  1403   LIP 41  --    DDIMER  --  <0.27        Imaging:  MRI ABDOMEN AND MRCP W/3D (W+W0)(CPT=74183/64777)    Result Date: 1/14/2024  CONCLUSION:  MRCP demonstrates common bile duct enlargement at 9 mm.  Distal common bile duct is blunted.  Punctate  filling defect within the distal common bile duct/ampulla suspicious for choledocholithiasis measuring 2-3 mm in diameter.  Dictated by (CST): Ezra Jorgensen MD on 1/14/2024 at 6:57 PM     Finalized by (CST): Ezra Jorgensen MD on 1/14/2024 at 7:01 PM          US GALLBLADDER (CPT=76705)    Result Date: 1/14/2024  CONCLUSION: Common bile duct is enlarged at 10 mm.  Etiology unclear, however raises the possibility of distal common bile duct stricture or choledocholithiasis.  Otherwise unremarkable CT appearance of the gallbladder.  No intrahepatic biliary ductal dilatation.     Dictated by (CST): Ezra Jorgensen MD on 1/14/2024 at 4:31 PM     Finalized by (CST): Ezra Jorgensen MD on 1/14/2024 at 4:32 PM          CT ABDOMEN+PELVIS(CONTRAST ONLY)(CPT=74177)    Result Date: 1/14/2024  CONCLUSION:  Nonspecific enlargement the common bile duct at 10 mm.  No significant intrahepatic biliary ductal dilatation.  Unremarkable CT appearance of the gallbladder.  Etiology of the biliary ductal dilatation is unclear.  Correlate for biliary colic and biliary  lab abnormality.  Dictated by (CST): Ezra Jorgensen MD on 1/14/2024 at 2:53 PM     Finalized by (CST): Ezra Jorgensen MD on 1/14/2024 at 2:56 PM          XR CHEST AP PORTABLE  (CPT=71045)    Result Date: 1/14/2024  CONCLUSION: No acute cardiopulmonary abnormality.    Dictated by (CST): Ezra Jorgensen MD on 1/14/2024 at 2:37 PM     Finalized by (CST): Ezra Jorgensen MD on 1/14/2024 at 2:37 PM            Impression:   Patient is a 38 year old female who was admitted to the hospital for RUQ pain:     -CT A/P showed dilated CBD.  MRCP with bile duct dilatation and filling deficit suspicious for choledolithiasis.  - labs without abnormalities  - continued abdominal pain    Plan:  - maintain npo  - ivf, antibiotics  - continue follow labs  - pain management  - trend labs  - ERCP  today with Dr Tavarez  - will need cholecystectomy, recommend surgical consult while inpatient    Time spent in direct  patient contact and decision making as well as counseling/coordination of care:  50 minutes    Thank you for allowing me to participate in the care of your patient.    Barbara Venegas MD  1/15/2024

## 2024-01-15 NOTE — H&P
History & Physical Examination    Patient Name: Nia Reilly  MRN: [id number]  [facility]: [id number]  YOB: 1985    Diagnosis: Biliary colic [K80.50]  RUQ pain [R10.11]  Dilation of common bile duct [K83.8]  Acute cough [R05.1]      Present Illness:  Nia Reilly is a 38 year old female is here Biliary colic [K80.50]  RUQ pain [R10.11]  Dilation of common bile duct [K83.8]  Acute cough [R05.1].    Body mass index is 21.68 kg/m².    Past Medical History:   Diagnosis Date    Endometriosis     Kidney stones        Procedure: EUS ERCP    Physician Pre-Sedation Assessment    Pre-Sedation Assessment:    Sedation History: Airway Assessed    ASA Classification: 2. Patient with mild systemic disease    Cardiac:    Respiratory:    Abdomen:      Plan: MAC        Current Facility-Administered Medications   Medication Dose Route Frequency    guaiFENesin (Robitussin) 100 MG/5 ML oral liquid 200 mg  200 mg Oral Q4H PRN    piperacillin-tazobactam (Zosyn) 3.375 g in dextrose 5% 100 mL IVPB-ADDV  3.375 g Intravenous Q8H    sodium chloride 0.9% infusion   Intravenous Continuous    melatonin tab 3 mg  3 mg Oral Nightly PRN    ondansetron (Zofran) 4 MG/2ML injection 4 mg  4 mg Intravenous Q6H PRN    prochlorperazine (Compazine) 10 MG/2ML injection 5 mg  5 mg Intravenous Q8H PRN    traMADol (Ultram) tab 50 mg  50 mg Oral Q6H PRN    lidocaine-menthol 4-1 % patch 1 patch  1 patch Transdermal Daily    escitalopram (Lexapro) tab 10 mg  10 mg Oral QAM       Allergies:   Allergies   Allergen Reactions    Norco [Apap-Fd&C Blue #1-Hydrocodone] HIVES    Potassium Citrate HIVES    Clindamycin RASH       Past Surgical History:   Procedure Laterality Date    CYST ASPIRATION LEFT      LITHOTRIPSY       History reviewed. No pertinent family history.  Social History     Tobacco Use    Smoking status: Every Day     Packs/day: 0.50     Years: 1.00     Additional pack years: 0.00     Total pack years: 0.50     Types: Cigarettes    Smokeless  tobacco: Never   Substance Use Topics    Alcohol use: Yes     Alcohol/week: 14.0 standard drinks of alcohol     Types: 14 Glasses of wine per week       SYSTEM Check if Review is Normal Check if Physical Exam is Normal If not normal, please explain:   HEENT [x ] [x ]    NECK & BACK [x ] [x ]    HEART [x ] [x ]    LUNGS [x ] [x ]    ABDOMEN [x ] [x ]    UROGENITAL [ ] [ ]    EXTREMITIES [ ] [ ]    OTHER        [ x ] I have discussed the risks and benefits and alternatives with the patient/family.  They understand and agree to proceed with plan of care.  [ x ] I have reviewed the History and Physical done within the last 30 days.  Any changes noted above.    Demario Tavarez MD  1/15/2024  3:55 PM

## 2024-01-15 NOTE — ANESTHESIA POSTPROCEDURE EVALUATION
Patient: Nia Reilly    Procedure Summary       Date: 01/15/24 Room / Location: [facility] ENDOSCOPY 05 / [facility] ENDOSCOPY    Anesthesia Start: 1607 Anesthesia Stop: 1630    Procedure: ENDOSCOPIC ULTRASOUND (EUS) Diagnosis: (dilated bile duct)    Surgeons: Demario Tavarez MD Anesthesiologist: Ashley Souza CRNA    Anesthesia Type: general ASA Status: 2            Anesthesia Type: general    Vitals Value Taken Time   /74 01/15/24 1628   Temp 98.6 °F (37 °C) 01/15/24 1628   Pulse 61 01/15/24 1628   Resp 16 01/15/24 1628   SpO2 99 % 01/15/24 1628       [facility] AN Post Evaluation:   Patient Evaluated in PACU  Patient Participation: complete - patient participated  Level of Consciousness: sleepy but conscious  Pain Score: 0  Pain Management: adequate  Airway Patency:patent  Dental exam unchanged from preop  Yes    Cardiovascular Status: acceptable  Respiratory Status: acceptable and room air  Postoperative Hydration acceptable      Ashley Souza CRNA  1/15/2024 4:30 PM

## 2024-01-15 NOTE — PROGRESS NOTES
[facility] Hospitalist Progress Note     CC: Hospital Follow up    PCP: PHYSICIAN NONSTAFF       Assessment/Plan:     Principal Problem:    RUQ pain  Active Problems:    Acute cough    Dilation of common bile duct    Biliary colic    Patient is a 38 year old female with PMH sig for SHAILESH and kidney stones who presented to the hospital with RUQ pain.      Choledocholithiasis  - labwork unremarkable, vitals stable  - CT A/P and RUQ US reviewed, dilated CBD at 1 cm, unremarkable gallbladder  - prn pain medications  - IVF, NPO for now  - MRCP with choledocholithiasis  - GI consulted  - will need outpatient f/u for cholecystectomy     Costochondritis  - pain to palpation over the R chest  - PRN pain medications  - lidocaine patch     SHAILESH  - lexapro     FN:  - IVF: NS  - Diet: NPO     DVT Prophy: ambulation  Lines: PIV       Thank You,  Samantha Duvall, DO    Hospitalist with [facility]     Subjective:   Patient feels ok. No further worsening of RUQ abdominal pain.  at bedside    OBJECTIVE:    Blood pressure 126/83, pulse 83, temperature 98.6 °F (37 °C), temperature source Oral, resp. rate 18, height 5' 6\" (1.676 m), weight 134 lb 4.8 oz (60.9 kg), SpO2 98%.    Temp:  [97.7 °F (36.5 °C)-98.7 °F (37.1 °C)] 98.6 °F (37 °C)  Pulse:  [] 83  Resp:  [16-22] 18  BP: (118-136)/(78-93) 126/83  SpO2:  [96 %-100 %] 98 %      Intake/Output:    Intake/Output Summary (Last 24 hours) at 1/15/2024 1017  Last data filed at 1/15/2024 0628  Gross per 24 hour   Intake --   Output 150 ml   Net -150 ml       Last 3 Weights   01/14/24 1926 134 lb 4.8 oz (60.9 kg)   01/14/24 1305 138 lb (62.6 kg)   01/14/24 1734 138 lb 0.1 oz (62.6 kg)   10/03/23 1258 140 lb (63.5 kg)       Exam   Gen: No acute distress,  Heent: NC AT, neck supple  Pulm: Lungs clear, normal respiratory effort  CV: Heart with regular rate and rhythm  Abd: Abdomen soft, no TTP  MSK: no clubbing, no cyanosis  Skin: no rashes or lesions        Data  Review:       Labs:     Recent Labs   Lab 01/14/24  1337 01/15/24  0543   RBC 4.57 4.26   HGB 14.7 13.6   HCT 43.5 40.5   MCV 95.2 95.1   MCH 32.2 31.9   MCHC 33.8 33.6   RDW 12.4 12.5   NEPRELIM 5.29  --    WBC 8.5 5.7   .0 187.0         Recent Labs   Lab 01/14/24  1337 01/15/24  0543   GLU 92 86   BUN 12 12   CREATSERUM 0.89 0.85   EGFRCR 85 89   CA 9.6 9.0    141   K 3.7 3.8    110   CO2 28.0 26.0       Recent Labs   Lab 01/14/24  1337 01/15/24  0543   ALT 20 16   AST 24 18   ALB 4.8 4.1         Imaging:  MRI ABDOMEN AND MRCP W/3D (W+W0)(CPT=74183/[id number])    Result Date: 1/14/2024  CONCLUSION:  MRCP demonstrates common bile duct enlargement at 9 mm.  Distal common bile duct is blunted.  Punctate filling defect within the distal common bile duct/ampulla suspicious for choledocholithiasis measuring 2-3 mm in diameter.  Dictated by (CST): Ezra Jorgensen MD on 1/14/2024 at 6:57 PM     Finalized by (CST): Ezra Jorgesnen MD on 1/14/2024 at 7:01 PM          US GALLBLADDER (CPT=[id number])    Result Date: 1/14/2024  CONCLUSION: Common bile duct is enlarged at 10 mm.  Etiology unclear, however raises the possibility of distal common bile duct stricture or choledocholithiasis.  Otherwise unremarkable CT appearance of the gallbladder.  No intrahepatic biliary ductal dilatation.     Dictated by (CST): Ezra Jorgensen MD on 1/14/2024 at 4:31 PM     Finalized by (CST): Ezra Jorgensen MD on 1/14/2024 at 4:32 PM          CT ABDOMEN+PELVIS(CONTRAST ONLY)(CPT=74177)    Result Date: 1/14/2024  CONCLUSION:  Nonspecific enlargement the common bile duct at 10 mm.  No significant intrahepatic biliary ductal dilatation.  Unremarkable CT appearance of the gallbladder.  Etiology of the biliary ductal dilatation is unclear.  Correlate for biliary colic and biliary  lab abnormality.  Dictated by (CST): Ezra Jorgensen MD on 1/14/2024 at 2:53 PM     Finalized by (CST): Ezra Jorgensen MD on 1/14/2024 at 2:56 PM          XR CHEST AP PORTABLE   (CPT=[id number])    Result Date: 1/14/2024  CONCLUSION: No acute cardiopulmonary abnormality.    Dictated by (CST): Ezra Jorgensen MD on 1/14/2024 at 2:37 PM     Finalized by (CST): Ezra Jorgensen MD on 1/14/2024 at 2:37 PM             Meds:      lidocaine-menthol  1 patch Transdermal Daily    escitalopram  10 mg Oral QAM      sodium chloride 100 mL/hr at 01/15/24 0622     melatonin, ondansetron, prochlorperazine, traMADol

## 2024-01-15 NOTE — PLAN OF CARE
Patient affirms mild pain. Lidocaine patch in place,NPO.safety precautions in place.call light within reach. Plan team to see patient am and review MRCP result.  Problem: Patient Centered Care  Goal: Patient preferences are identified and integrated in the patient's plan of care  Description: Interventions:  - What would you like us to know as we care for you? From home with   - Provide timely, complete, and accurate information to patient/family  - Incorporate patient and family knowledge, values, beliefs, and cultural backgrounds into the planning and delivery of care  - Encourage patient/family to participate in care and decision-making at the level they choose  - Honor patient and family perspectives and choices  Outcome: Progressing     Problem: Patient/Family Goals  Goal: Patient/Family Long Term Goal  Description: Patient's Long Term Goal: go home    Interventions:  - see MD orders  - See additional Care Plan goals for specific interventions  Outcome: Progressing  Goal: Patient/Family Short Term Goal  Description: Patient's Short Term Goal: feel better    Interventions:   - monitor labs   -monitor v/s  - See additional Care Plan goals for specific interventions  Outcome: Progressing     Problem: PAIN - ADULT  Goal: Verbalizes/displays adequate comfort level or patient's stated pain goal  Description: INTERVENTIONS:  - Encourage pt to monitor pain and request assistance  - Assess pain using appropriate pain scale  - Administer analgesics based on type and severity of pain and evaluate response  - Implement non-pharmacological measures as appropriate and evaluate response  - Consider cultural and social influences on pain and pain management  - Manage/alleviate anxiety  - Utilize distraction and/or relaxation techniques  - Monitor for opioid side effects  - Notify MD/LIP if interventions unsuccessful or patient reports new pain  - Anticipate increased pain with activity and pre-medicate as  appropriate  Outcome: Progressing

## 2024-01-15 NOTE — ANESTHESIA PREPROCEDURE EVALUATION
Anesthesia PreOp Note    HPI:     Nia Reilly is a 38 year old female who presents for preoperative consultation requested by: Demario Tavarez MD    Date of Surgery: 1/14/2024 - 1/15/2024    Procedure(s):  ENDOSCOPIC RETROGRADE CHOLANGIOPANCREATOGRAPHY (ERCP)/ENDOSCOPIC ULTRASOUND (EUS)  ENDOSCOPIC ULTRASOUND (EUS)  Indication: choledocholithiasis    Relevant Problems   No relevant active problems       NPO:  Last Liquid Consumption Date: 01/15/24  Last Liquid Consumption Time: 0900  Last Solid Consumption Date: 01/14/24  Last Solid Consumption Time: 0930  Last Liquid Consumption Date: 01/15/24          History Review:  Patient Active Problem List    Diagnosis Date Noted    RUQ pain 01/14/2024    Acute cough 01/14/2024    Dilation of common bile duct 01/14/2024    Biliary colic 01/14/2024       Past Medical History:   Diagnosis Date    Endometriosis     Kidney stones        Past Surgical History:   Procedure Laterality Date    CYST ASPIRATION LEFT      LITHOTRIPSY         Medications Prior to Admission   Medication Sig Dispense Refill Last Dose    Drospirenone (SLYND) 4 MG Oral Tab Take 4 tablets by mouth daily.   1/14/2024    escitalopram 10 MG Oral Tab Take 1 tablet (10 mg total) by mouth daily.   1/14/2024 at 0800     Current Facility-Administered Medications Ordered in Epic   Medication Dose Route Frequency Provider Last Rate Last Admin    guaiFENesin (Robitussin) 100 MG/5 ML oral liquid 200 mg  200 mg Oral Q4H PRN Samantha Duvall, DO   200 mg at 01/15/24 1110    piperacillin-tazobactam (Zosyn) 3.375 g in dextrose 5% 100 mL IVPB-ADDV  3.375 g Intravenous Q8H Samantha Duvall, DO 25 mL/hr at 01/15/24 1313 3.375 g at 01/15/24 1313    sodium chloride 0.9% infusion   Intravenous Continuous Samantha Duvall,  mL/hr at 01/15/24 0622 New Bag at 01/15/24 0622    melatonin tab 3 mg  3 mg Oral Nightly PRN Debi Duvallya, DO        ondansetron (Zofran) 4 MG/2ML injection 4 mg  4 mg  Intravenous Q6H PRN Samantha Duvall,         prochlorperazine (Compazine) 10 MG/2ML injection 5 mg  5 mg Intravenous Q8H PRN Samantha Duvall DO        traMADol (Ultram) tab 50 mg  50 mg Oral Q6H PRN Samantha Duvall,         lidocaine-menthol 4-1 % patch 1 patch  1 patch Transdermal Daily Samantha Duvall DO   1 patch at 01/15/24 0828    escitalopram (Lexapro) tab 10 mg  10 mg Oral QAM Samantha Duvall, DO   10 mg at 01/15/24 0828     No current Epic-ordered outpatient medications on file.       Allergies   Allergen Reactions    Norco [Apap-Fd&C Blue #1-Hydrocodone] HIVES    Potassium Citrate HIVES    Clindamycin RASH       History reviewed. No pertinent family history.  Social History     Socioeconomic History    Marital status:    Tobacco Use    Smoking status: Every Day     Packs/day: 0.50     Years: 1.00     Additional pack years: 0.00     Total pack years: 0.50     Types: Cigarettes    Smokeless tobacco: Never   Vaping Use    Vaping Use: Never used   Substance and Sexual Activity    Alcohol use: Yes     Alcohol/week: 14.0 standard drinks of alcohol     Types: 14 Glasses of wine per week    Drug use: Not Currently       Available pre-op labs reviewed.  Lab Results   Component Value Date    WBC 5.7 01/15/2024    RBC 4.26 01/15/2024    HGB 13.6 01/15/2024    HCT 40.5 01/15/2024    MCV 95.1 01/15/2024    MCH 31.9 01/15/2024    MCHC 33.6 01/15/2024    RDW 12.5 01/15/2024    .0 01/15/2024    URINEPREG Negative 01/14/2024     Lab Results   Component Value Date     01/15/2024    K 3.8 01/15/2024     01/15/2024    CO2 26.0 01/15/2024    BUN 12 01/15/2024    CREATSERUM 0.85 01/15/2024    GLU 86 01/15/2024    CA 9.0 01/15/2024          Vital Signs:  Body mass index is 21.68 kg/m².   height is 1.676 m (5' 6\") and weight is 60.9 kg (134 lb 4.8 oz). Her oral temperature is 98.6 °F (37 °C). Her blood pressure is 120/78 and her pulse is 60. Her respiration is 18 and oxygen  saturation is 98%.   Vitals:    01/14/24 1926 01/15/24 0623 01/15/24 0826 01/15/24 1453   BP: 126/83 118/78 126/83 120/78   Pulse: 74 77 83 60   Resp: 16 18 18 18   Temp: 98.7 °F (37.1 °C) 98.4 °F (36.9 °C) 98.6 °F (37 °C)    TempSrc: Oral Oral Oral    SpO2: 96% 98% 98% 98%   Weight: 60.9 kg (134 lb 4.8 oz)      Height: 1.676 m (5' 6\")           Anesthesia Evaluation     Patient summary reviewed and Nursing notes reviewed    Airway   Mallampati: II  TM distance: >3 FB  Neck ROM: full  Dental - Dentition appears grossly intact     Comment: Caps on molars lower L & R    Pulmonary - negative ROS and normal exam   Cardiovascular - negative ROS and normal exam    Rhythm: regular  Rate: normal    Neuro/Psych - negative ROS     GI/Hepatic/Renal - negative ROS     Endo/Other - negative ROS   Abdominal                  Anesthesia Plan:   ASA:  2  Plan:   General and MAC  Informed Consent Plan and Risks Discussed With:  Patient  Discussed plan with:  Surgeon      I have informed Nia Reilly and/or legal guardian or family member of the nature of the anesthetic plan, benefits, risks including possible dental damage if relevant, major complications, and any alternative forms of anesthetic management.   All of the patient's questions were answered to the best of my ability. The patient desires the anesthetic management as planned.  Ashley Souza CRNA  1/15/2024 3:56 PM  Present on Admission:  **None**

## 2024-01-15 NOTE — PLAN OF CARE
Patient alert and oriented times 4. On RA. Complains of some discomfort to RUQ. Lidocaine patch in place. Currently NPO. Plan is to have ERCP done today.   Problem: Patient Centered Care  Goal: Patient preferences are identified and integrated in the patient's plan of care  Description: Interventions:    - Provide timely, complete, and accurate information to patient/family  - Incorporate patient and family knowledge, values, beliefs, and cultural backgrounds into the planning and delivery of care  - Encourage patient/family to participate in care and decision-making at the level they choose  - Honor patient and family perspectives and choices  Outcome: Progressing     Problem: Patient/Family Goals  Goal: Patient/Family Short Term Goal  Description: Patient's Short Term Goal:     Interventions:   - See additional Care Plan goals for specific interventions  Outcome: Progressing

## 2024-01-15 NOTE — OPERATIVE REPORT
[facility] OPERATIVE REPORT   PATIENT NAME: Nia Reilly  MRN: [id number]  DATE OF OPERATION: 1/15/2024  PREOPERATIVE DIAGNOSIS: Right upper quadrant abdominal pain since Thanksgiving, upper respiratory infection, normal liver tests, MRCP showing mildly dilated CBD of 9 mm with a \" cutoff\" in the distal CBD suggestive of possible choledocholithiasis; gallbladder without stones  POSTOPERATIVE DIAGNOSIS:    1.  Mildly dilated CBD of 6 m without choledocholithiasis   2.  Smooth tapering of the distal common bile duct   3.  Normal pancreas, gallbladder   4.  Normal stomach, duodenum  PROCEDURE PERFORMED: upper endoscopy/ ENDOSCOPIC ultrasound  SEDATION MEDICATIONS: MAC  PREOPERATIVE MEDICATIONS: Zosyn   PREPROCEDURE ASSESSMENT: The indication for this procedure is to assess for choledocholithiasis. The patient was identified by myself and nursing staff in the exam room. Informed consent was obtained. The patient was seen in clinic and a full H&P was obtained. On brief physical examination, airway is patent. Chest is clear. Heart has regular rate and rhythm. Abdomen is soft, nontender with good bowel sounds. A medication list was taken by nursing today and reviewed by myself. The patient is an ASA grade 2.   PROCEDURE NOTE: The procedure was completed without difficulty. The patient tolerated the procedure well.   Upper endoscopy (EGD):  The endoscope was inserted through the mouth and advanced to the level of the duodenum, 3rd portion.  Visualized portion of the esophagus, stomach including antrum, body, fundus and cardiac, and duodenum were normal.  Ampulla was visualized with a side-viewing EUS scope.  Endoscopic ultrasound (EUS):  Endoscopic ultrasound was performed using the linear echoendoscope.  Images were obtained.    LIVER: Left lobe of the liver was visualized and no mass or lesions seen.  No intrahepatic duct dilation was noted.  Portal vein was noted to come out of the liver and the portal  confluence was seen and pancreas was noted.   PANCREAS:  Pancreatic neck, body, and tail were interrogated from the gastric body while the neck, head and uncinate were examined from the 1st and 2nd duodenum.  PD- normal throughout  Pancreas divisum: no  Chronic pancreatitis changes: no  Neoplasm: no   Cysts:   no  Biliary Tree:  - common hepatic duct: 6.6 mm  - mid: 3 mm  - stones: no  GALLBLADDER: visualized and was normal without stones or sludge  CELIAC AXIS:  visualized without lymphadenopathy  L ADRENAL GLAND:  visualized   L KIDNEY:  visualized   MEDIASTINUM:  visualized without lymphadenopathy  Scope was withdrawn from the patient and patient tolerated the procedure well.  FINDINGS   There is absolutely no evidence of choledocholithiasis.  On review of the MRCP, there is no clear-cut evidence of choledocholithiasis.  There is no gallstones.  Etiology of right upper abdominal pain is unclear.  Patient never had elevated liver enzymes to suggest biliary obstruction.  Consider nonulcer dyspepsia as a potential diagnosis.  It is possible patient has biliary dyskinesia and may require outpatient HIDA-[facility]  RECOMMENDATIONS: will check stool for H pylori; advance diet as tolerated  DISCHARGE:  On discharge, the patient was given an after-visit summary detailing the procedure, findings, followup plans, and an updated medication list.     Thank you very much for the consultation.  I really appreciate it.    Demario Tavarez MD

## 2025-02-17 ENCOUNTER — TELEPHONE (OUTPATIENT)
Dept: SLEEP CENTER | Age: 40
End: 2025-02-17

## 2025-02-20 ENCOUNTER — ORDER TRANSCRIPTION (OUTPATIENT)
Dept: SLEEP CENTER | Age: 40
End: 2025-02-20

## 2025-02-20 DIAGNOSIS — G47.30 SLEEP DISORDER BREATHING: Primary | ICD-10-CM

## 2025-02-21 ENCOUNTER — TELEPHONE (OUTPATIENT)
Dept: SLEEP CENTER | Age: 40
End: 2025-02-21

## (undated) DEVICE — 60 ML SYRINGE REGULAR TIP: Brand: MONOJECT

## (undated) DEVICE — CONMED SCOPE SAVER BITE BLOCK, 20X27 MM: Brand: SCOPE SAVER

## (undated) DEVICE — MEDI-VAC NON-CONDUCTIVE SUCTION TUBING: Brand: CARDINAL HEALTH

## (undated) DEVICE — MEDI-VAC NON-CONDUCTIVE SUCTION TUBING 6MM X 1.8M (6FT.) L: Brand: CARDINAL HEALTH

## (undated) DEVICE — YANKAUER,BULB TIP,W/O VENT,RIGID,STERILE: Brand: MEDLINE

## (undated) DEVICE — KIT CLEAN ENDOKIT 1.1OZ GOWNX2

## (undated) DEVICE — LOCKING DEVICE AND BIOPSY CAP FOR USE WITH RX BILIARY SYSTEM: Brand: RX LOCKING DEVICE AND BIOPSY CAP

## (undated) DEVICE — KIT ENDO ORCAPOD 160/180/190

## (undated) NOTE — LETTER
[facility]  [address], [place], [place]  Authorization for Surgical Operation and Procedure                                                                                           I hereby authorize Demario Tavarez MD, my physician and his/her assistants (if applicable), which may include medical students, residents, and/or fellows, to perform the following surgical operation/ procedure and administer such anesthesia as may be determined necessary by my physician: Operation/Procedure name (s) ENDOSCOPIC RETROGRADE CHOLANGIOPANCREATOGRAPHY (ERCP) on Nia Reilly   2.   I recognize that during the surgical operation/procedure, unforeseen conditions may necessitate additional or different procedures than those listed above.  I, therefore, further authorize and request that the above-named surgeon, assistants, or designees perform such procedures as are, in their judgment, necessary and desirable.    3.   My surgeon/physician has discussed prior to my surgery the potential benefits, risks and side effects of this procedure; the likelihood of achieving goals; and potential problems that might occur during recuperation.  They also discussed reasonable alternatives to the procedure, including risks, benefits, and side effects related to the alternatives and risks related to not receiving this procedure.  I have had all my questions answered and I acknowledge that no guarantee has been made as to the result that may be obtained.    4.   Should the need arise during my operation/procedure, which includes change of level of care prior to discharge, I also consent to the administration of blood and/or blood products.  Further, I understand that despite careful testing and screening of blood or blood products by collecting agencies, I may still be subject to ill effects as a result of receiving a blood transfusion and/or blood products.  The following are some, but not all, of the potential  risks that can occur: fever and allergic reactions, hemolytic reactions, transmission of diseases such as Hepatitis, AIDS and Cytomegalovirus (CMV) and fluid overload.  In the event that I wish to have an autologous transfusion of my own blood, or a directed donor transfusion, I will discuss this with my physician.  Check only if Refusing Blood or Blood Products  I understand refusal of blood or blood products as deemed necessary by my physician may have serious consequences to my condition to include possible death. I hereby assume responsibility for my refusal and release the hospital, its personnel, and my physicians from any responsibility for the consequences of my refusal.    o  Refuse   5.   I authorize the use of any specimen, organs, tissues, body parts or foreign objects that may be removed from my body during the operation/procedure for diagnosis, research or teaching purposes and their subsequent disposal by hospital authorities.  I also authorize the release of specimen test results and/or written reports to my treating physician on the hospital medical staff or other referring or consulting physicians involved in my care, at the discretion of the Pathologist or my treating physician.    6.   I consent to the photographing or videotaping of the operations or procedures to be performed, including appropriate portions of my body for medical, scientific, or educational purposes, provided my identity is not revealed by the pictures or by descriptive texts accompanying them.  If the procedure has been photographed/videotaped, the surgeon will obtain the original picture, image, videotape or CD.  The hospital will not be responsible for storage, release or maintenance of the picture, image, tape or CD.    7.   I consent to the presence of a  or observers in the operating room as deemed necessary by my physician or their designees.    8.   I recognize that in the event my procedure results in  extended X-Ray/fluoroscopy time, I may develop a skin reaction.    9. If I have a Do Not Attempt Resuscitation (DNAR) order in place, that status will be suspended while in the operating room, procedural suite, and during the recovery period unless otherwise explicitly stated by me (or a person authorized to consent on my behalf). The surgeon or my attending physician will determine when the applicable recovery period ends for purposes of reinstating the DNAR order.  10. Patients having a sterilization procedure: I understand that if the procedure is successful the results will be permanent and it will therefore be impossible for me to inseminate, conceive, or bear children.  I also understand that the procedure is intended to result in sterility, although the result has not been guaranteed.   11. I acknowledge that my physician has explained sedation/analgesia administration to me including the risk and benefits I consent to the administration of sedation/analgesia as may be necessary or desirable in the judgment of my physician.    I CERTIFY THAT I HAVE READ AND FULLY UNDERSTAND THE ABOVE CONSENT TO OPERATION and/or OTHER PROCEDURE.     _________________________________________ _________________________________     ___________________________________  Signature of Patient     Signature of Responsible Person                   Printed Name of Responsible Person                              _________________________________________ ______________________________        ___________________________________  Signature of Witness         Date  Time         Relationship to Patient    STATEMENT OF PHYSICIAN My signature below affirms that prior to the time of the procedure; I have explained to the patient and/or his/her legal representative, the risks and benefits involved in the proposed treatment and any reasonable alternative to the proposed treatment. I have also explained the risks and benefits involved in refusal of  the proposed treatment and alternatives to the proposed treatment and have answered the patient's questions. If I have a significant financial interest in a co-management agreement or a significant financial interest in any product or implant, or other significant relationship used in this procedure/surgery, I have disclosed this and had a discussion with my patient.     _______________________________________________________________ _____________________________  (Signature of Physician)                                                                                         (Date)                                   (Time)  Patient Name: Nia Reilly    : 1985   Printed: 1/15/2024      Medical Record #: [id number]                                              Page 1 of 1

## (undated) NOTE — LETTER
[place] [address]  Authorization for Invasive Procedures  Date: 1/15/24           Time: 1:28    I hereby authorize Demario Garcia, my physician and his/her assistants (if applicable), which may include medical students, residents, and/or fellows, to perform the following surgical operation/ procedure and administer such anesthesia as may be determined necessary by my physician: Endoscopic Retrograde Cholangiopancreatography  on Nia Reilly  2.   I recognize that during the surgical operation/procedure, unforeseen conditions may necessitate additional or different procedures than those listed above.  I, therefore, further authorize and request that the above-named surgeon, assistants, or designees perform such procedures as are, in their judgment, necessary and desirable.    3.   My surgeon/physician has discussed prior to my surgery the potential benefits, risks and side effects of this procedure; the likelihood of achieving goals; and potential problems that might occur during recuperation.  They also discussed reasonable alternatives to the procedure, including risks, benefits, and side effects related to the alternatives and risks related to not receiving this procedure.  I have had all my questions answered and I acknowledge that no guarantee has been made as to the result that may be obtained.    4.   Should the need arise during my operation/procedure, which includes change of level of care prior to discharge, I also consent to the administration of blood and/or blood products.  Further, I understand that despite careful testing and screening of blood or blood products by collecting agencies, I may still be subject to ill effects as a result of receiving a blood transfusion and/or blood products.  The following are some, but not all, of the potential risks that can occur: fever and allergic reactions, hemolytic reactions, transmission of diseases such as Hepatitis, AIDS and  Cytomegalovirus (CMV) and fluid overload.  In the event that I wish to have an autologous transfusion of my own blood, or a directed donor transfusion, I will discuss this with my physician.   Check only if Refusing Blood or Blood Products  I understand refusal of blood or blood products as deemed necessary by my physician may have serious consequences to my condition to include possible death. I hereby assume responsibility for my refusal and release the hospital, its personnel, and my physicians from any responsibility for the consequences of my refusal.         o  Refuse         5.   I authorize the use of any specimen, organs, tissues, body parts or foreign objects that may be removed from my body during the operation/procedure for diagnosis, research or teaching purposes and their subsequent disposal by hospital authorities.  I also authorize the release of specimen test results and/or written reports to my treating physician on the hospital medical staff or other referring or consulting physicians involved in my care, at the discretion of the Pathologist or my treating physician.    6.   I consent to the photographing or videotaping of the operations or procedures to be performed, including appropriate portions of my body for medical, scientific, or educational purposes, provided my identity is not revealed by the pictures or by descriptive texts accompanying them.  If the procedure has been photographed/videotaped, the surgeon will obtain the original picture, image, videotape or CD.  The hospital will not be responsible for storage, release or maintenance of the picture, image, tape or CD.    7.   I consent to the presence of a  or observers in the operating room as deemed necessary by my physician or their designees.    8.   I recognize that in the event my procedure results in extended X-Ray/fluoroscopy time, I may develop a skin reaction.    9. If I have a Do Not Attempt Resuscitation  (DNAR) order in place, that status will be suspended while in the operating room, procedural suite, and during the recovery period unless otherwise explicitly stated by me (or a person authorized to consent on my behalf). The surgeon or my attending physician will determine when the applicable recovery period ends for purposes of reinstating the DNAR order.  10. Patients having a sterilization procedure: I understand that if the procedure is successful the results will be permanent and it will therefore be impossible for me to inseminate, conceive, or bear children.  I also understand that the procedure is intended to result in sterility, although the result has not been guaranteed.   11. I acknowledge that my physician has explained sedation/analgesia administration to me including the risk and benefits I consent to the administration of sedation/analgesia as may be necessary or desirable in the judgment of my physician.    I CERTIFY THAT I HAVE READ AND FULLY UNDERSTAND THE ABOVE CONSENT TO OPERATION and/or OTHER PROCEDURE.        ____________________________________       _________________________________      ______________________________  Signature of Patient         Signature of Responsible Person        Printed Name of Responsible Person        ____________________________________      _________________________________      ______________________________       Signature of Witness          Relationship to Patient                       Date                                       Time    Patient Name: Nia Reilly     : 1985                 Printed: January 15, 2024      Medical Record #: [id number]                      Page 1 of 2          STATEMENT OF PHYSICIAN My signature below affirms that prior to the time of the procedure; I have explained to the patient and/or his/her legal representative, the risks and benefits involved in the proposed treatment and any reasonable alternative to the  proposed treatment. I have also explained the risks and benefits involved in refusal of the proposed treatment and alternatives to the proposed treatment and have answered the patient's questions. If I have a significant financial interest in a co-management agreement or a significant financial interest in any product or implant, or other significant relationship used in this procedure/surgery, I have disclosed this and had a discussion with my patient.     _______________________________________________________________ _____________________________  (Signature of Physician)                                                                                         (Date)                                   (Time)    Patient Name: Nia Reilly     : 1985                 Printed: January 15, 2024      Medical Record #: [id number]                      Page 2 of 2